# Patient Record
Sex: MALE | Race: WHITE | Employment: OTHER | URBAN - METROPOLITAN AREA
[De-identification: names, ages, dates, MRNs, and addresses within clinical notes are randomized per-mention and may not be internally consistent; named-entity substitution may affect disease eponyms.]

---

## 2021-10-04 PROBLEM — I21.4 NSTEMI (NON-ST ELEVATED MYOCARDIAL INFARCTION) (HCC): Status: ACTIVE | Noted: 2021-10-04

## 2021-10-04 PROBLEM — R07.9 CHEST PAIN: Status: ACTIVE | Noted: 2021-10-04

## 2021-10-07 ENCOUNTER — HOSPITAL ENCOUNTER (EMERGENCY)
Age: 66
Discharge: HOME | End: 2021-10-07
Attending: EMERGENCY MEDICINE
Payer: COMMERCIAL

## 2021-10-07 ENCOUNTER — APPOINTMENT (OUTPATIENT)
Dept: NON INVASIVE DIAGNOSTICS | Age: 66
End: 2021-10-07
Attending: EMERGENCY MEDICINE
Payer: COMMERCIAL

## 2021-10-07 VITALS
OXYGEN SATURATION: 98 % | WEIGHT: 225 LBS | BODY MASS INDEX: 30.48 KG/M2 | RESPIRATION RATE: 18 BRPM | SYSTOLIC BLOOD PRESSURE: 124 MMHG | TEMPERATURE: 98.4 F | HEIGHT: 72 IN | HEART RATE: 70 BPM | DIASTOLIC BLOOD PRESSURE: 73 MMHG

## 2021-10-07 DIAGNOSIS — M79.662 PAIN OF LEFT CALF: Primary | ICD-10-CM

## 2021-10-07 PROCEDURE — 99282 EMERGENCY DEPT VISIT SF MDM: CPT

## 2021-10-07 PROCEDURE — 93971 EXTREMITY STUDY: CPT

## 2021-10-07 NOTE — ED PROVIDER NOTES
EMERGENCY DEPARTMENT HISTORY AND PHYSICAL EXAM        Date: 10/7/2021  Patient Name: Dina Amin    History of Presenting Illness     Chief Complaint   Patient presents with    Leg Pain       History Provided By: Patient    HPI: Dina Amin, 60 y.o. male with history of CAD, hypertension who presents with left calf pain. States symptoms started last night. Pain is in the left calf, constant, not rating, moderate, and worse with movement. States that he had a recent heart catheterization 5 days ago. PCP: None    Current Outpatient Medications   Medication Sig Dispense Refill    ranolazine ER (RANEXA) 500 mg SR tablet Take 1 Tablet by mouth two (2) times a day. 60 Tablet 0    aspirin 81 mg chewable tablet Take 81 mg by mouth daily.  clonazePAM (KlonoPIN) 0.5 mg tablet Take 0.5 mg by mouth nightly as needed for Anxiety.  doxazosin (Cardura) 1 mg tablet Take  by mouth daily.  ezetimibe (Zetia) 10 mg tablet Take  by mouth.  losartan (Cozaar) 25 mg tablet Take 25 mg by mouth daily.  omeprazole (PriLOSEC OTC) 20 mg tablet Take 20 mg by mouth daily.  rosuvastatin (Crestor) 40 mg tablet Take 40 mg by mouth nightly.  naftifine (NAFTIN) 1 % topical cream Apply  to affected area daily. On feet         Past History     Past Medical History:  Past Medical History:   Diagnosis Date    CAD (coronary artery disease)     Heart attack ([facility] [address]     Hypertension     LBBB (left bundle branch block)     S/P AV dusty ablation        Past Surgical History:  Past Surgical History:   Procedure Laterality Date    HX CORONARY ARTERY BYPASS GRAFT      [place] CARDIAC SURG PROCEDURE UNLIST      bypass       Family History:  Family History   Problem Relation Age of Onset    Heart Disease Father        Social History:  Social History     Tobacco Use    Smoking status: Former Smoker   Substance Use Topics    Alcohol use:  Yes    Drug use: Not on file       Allergies:  No Known Allergies        Review of Systems   Review of Systems   Constitutional: Negative for fever. HENT: Negative for congestion. Eyes: Negative for visual disturbance. Respiratory: Negative for shortness of breath. Cardiovascular: Negative for chest pain and leg swelling. Gastrointestinal: Negative for abdominal pain. Genitourinary: Negative for dysuria. Musculoskeletal: Negative for arthralgias. Skin: Negative for rash. Neurological: Negative for headaches. Physical Exam   Constitutional: No acute distress. Well-nourished. Skin: No rash. ENT: No rhinorrhea. No cough. Head is normocephalic and atraumatic. Eye: No proptosis or conjunctival injections. Respiratory: No apparent respiratory distress. Gastrointestinal: Nondistended. Musculoskeletal: No obvious bony deformities. Tenderness to left posterior calf. No leg swelling. No rash. Normal perfusion. Psychiatric: Cooperative. Appropriate mood and affect. Diagnostic Study Results     Labs -   No results found for this or any previous visit (from the past 24 hour(s)). Radiologic Studies -   DUPLEX LOWER EXT VENOUS LEFT    (Results Pending)     CT Results  (Last 48 hours)    None        CXR Results  (Last 48 hours)    None          Medical Decision Making and ED Course     I reviewed the available vital signs, nursing notes, past medical history, past surgical history, family history, and social history. Vital Signs - Reviewed the patient's vital signs. Patient Vitals for the past 12 hrs:   Temp Pulse Resp BP SpO2   10/07/21 1523  70 18 124/73 98 %   10/07/21 1346 98.4 °F (36.9 °C) 69 18 112/68 95 %       Medical Decision Making:   Presented with left calf pain. The differential diagnosis is DVT, superficial thrombophlebitis, muscle strain. No DVT on ultrasound. Does not appear to be swollen. No signs of superficial thrombophlebitis. This could be muscular symptoms such as muscular strain. Unclear etiology.   Recommended close observation, rest, ice, elevation. Recommended he return for repeat ultrasound if he notices any leg swelling. Disposition     Discharged      DISCHARGE PLAN:  1. Current Discharge Medication List      CONTINUE these medications which have NOT CHANGED    Details   ranolazine ER (RANEXA) 500 mg SR tablet Take 1 Tablet by mouth two (2) times a day. Qty: 60 Tablet, Refills: 0      aspirin 81 mg chewable tablet Take 81 mg by mouth daily. clonazePAM (KlonoPIN) 0.5 mg tablet Take 0.5 mg by mouth nightly as needed for Anxiety. doxazosin (Cardura) 1 mg tablet Take  by mouth daily. ezetimibe (Zetia) 10 mg tablet Take  by mouth.      losartan (Cozaar) 25 mg tablet Take 25 mg by mouth daily. omeprazole (PriLOSEC OTC) 20 mg tablet Take 20 mg by mouth daily. rosuvastatin (Crestor) 40 mg tablet Take 40 mg by mouth nightly. naftifine (NAFTIN) 1 % topical cream Apply  to affected area daily. On feet           2. Follow-up Information     Follow up With Specialties Details Why [address] [facility] EMERGENCY DEPT Emergency Medicine Go today As soon as possible if symptoms worsen [facility] [place] [address]  [phone]    Primary care doctor  Schedule an appointment as soon as possible for a visit in 3 days          3. Return to ED if worse     Diagnosis     Clinical impression:   1. Pain of left calf           Attestation:  Please note that this dictation was completed with [employer], the computer voice recognition software. Quite often unanticipated grammatical, syntax, homophones, and other interpretive errors are inadvertently transcribed by the computer software. Please disregard these errors. Please excuse any errors that have escaped final proofreading. Thank you.   Eduarda Sandoval, DO

## 2021-10-07 NOTE — ED TRIAGE NOTES
Reports had heart attack over the weekend, last night developed stinging pain in lt calf, spoke with cardiologist who is concerned for a DVT and was told to come to the ER.

## 2021-10-22 ENCOUNTER — HOSPITAL ENCOUNTER (OUTPATIENT)
Dept: CARDIAC REHAB | Age: 66
Discharge: HOME | End: 2021-10-22
Payer: COMMERCIAL

## 2021-10-22 VITALS — HEIGHT: 72 IN | WEIGHT: 220 LBS | BODY MASS INDEX: 29.8 KG/M2

## 2021-10-22 PROCEDURE — 93798 PHYS/QHP OP CAR RHAB W/ECG: CPT

## 2021-10-22 RX ORDER — CLOPIDOGREL BISULFATE 75 MG/1
75 TABLET ORAL DAILY
COMMUNITY

## 2021-10-22 NOTE — CARDIO/PULMONARY
[address] Cardiac Rehab- Intake Note  Rehab intake with patient and wife-Williams. Jem Clement  1955 presented for cardiac rehab orientation and exercise tolerance test today with a primary diagnosis of NSTEMI. This occurred in [facility] and pt was transferred to [facility] for further cardiac evaluation. Pt sees Dr. Toya Woodruff at [facility] has his primary cardiologist.    Pt has history of MI, CABG. LVEF is 40%. This was obtained via ECHO at the time of this 10/5/21 cardiac event. Cardiac risk factors include: age, gender, HTN, and HDL. Pt  is   and lives with his wife Catherine Rosales. He is retired from [employer]. Depression score PHQ9 is 3 and this is considered normal. Pt states he would like to return to the physical shape he once was. He knows this will take time and is working on being accepting and patient. Patient denied chest pain or SOB during 6 minute walk on treadmill at 2.4 mph, with RPE 11. Cardiac rhythm was SR with PVCs. Exercise plan was developed. Pt will attend cardiac rehab 2-3 times per week. Cardiac Rehab book reviewed and given to patient and wife.    Leretha Lundborg RN

## 2021-10-26 ENCOUNTER — HOSPITAL ENCOUNTER (OUTPATIENT)
Dept: CARDIAC REHAB | Age: 66
Discharge: HOME | End: 2021-10-26
Payer: COMMERCIAL

## 2021-10-26 VITALS — WEIGHT: 223.4 LBS | BODY MASS INDEX: 30.3 KG/M2

## 2021-10-26 PROCEDURE — 93798 PHYS/QHP OP CAR RHAB W/ECG: CPT

## 2021-10-27 ENCOUNTER — HOSPITAL ENCOUNTER (OUTPATIENT)
Dept: CARDIAC REHAB | Age: 66
Discharge: HOME | End: 2021-10-27
Payer: COMMERCIAL

## 2021-10-27 VITALS — BODY MASS INDEX: 30.35 KG/M2 | WEIGHT: 223.8 LBS

## 2021-10-27 PROCEDURE — 93798 PHYS/QHP OP CAR RHAB W/ECG: CPT

## 2021-10-28 ENCOUNTER — HOSPITAL ENCOUNTER (OUTPATIENT)
Dept: CARDIAC REHAB | Age: 66
Discharge: HOME | End: 2021-10-28
Payer: COMMERCIAL

## 2021-10-28 VITALS — BODY MASS INDEX: 30.3 KG/M2 | WEIGHT: 223.4 LBS

## 2021-10-28 PROCEDURE — 93797 PHYS/QHP OP CAR RHAB WO ECG: CPT

## 2021-10-28 PROCEDURE — 93798 PHYS/QHP OP CAR RHAB W/ECG: CPT

## 2021-11-01 ENCOUNTER — HOSPITAL ENCOUNTER (OUTPATIENT)
Dept: CARDIAC REHAB | Age: 66
Discharge: HOME | End: 2021-11-01
Payer: COMMERCIAL

## 2021-11-01 VITALS — BODY MASS INDEX: 30.6 KG/M2 | WEIGHT: 225.6 LBS

## 2021-11-01 PROCEDURE — 93798 PHYS/QHP OP CAR RHAB W/ECG: CPT

## 2021-11-01 NOTE — CARDIO/PULMONARY
[facility] Cardiopulmonary Rehab: Pt reported episodes of possible anginal discomfort which he described at \"Pain radiating from elbow\" down arm. Pt pointed to his [facility] & half way down the medial surface of his lower arm. Occurred while eating dinner on Friday (7 pm). Second episode occurred on Sunday, while walking slowly around neighborhood (7pm). Pain resolved after a few minutes and was not associated with any other discomfort or SOB. Pt reported he usually takes Imdur 30 mg in the morning. The tablet is scored and he initially was on only 1/2 tab. Pt expressed concern that the Imdur ER was \"wearing off too quickly. \" Pt wanted to know if he could take half tab in the morning and half tab at dinner, which may be reasonable for the next 2 days. Pt has appt with cardiologist Julianna Rondon at [facility] on Wed 11/3/21.  Pt to keep record of incidents of arm discomfort and report to MD.

## 2021-11-02 ENCOUNTER — HOSPITAL ENCOUNTER (OUTPATIENT)
Dept: CARDIAC REHAB | Age: 66
Discharge: HOME | End: 2021-11-02
Payer: COMMERCIAL

## 2021-11-02 VITALS — WEIGHT: 233.2 LBS | BODY MASS INDEX: 31.63 KG/M2

## 2021-11-02 PROCEDURE — 93798 PHYS/QHP OP CAR RHAB W/ECG: CPT

## 2021-11-03 ENCOUNTER — HOSPITAL ENCOUNTER (OUTPATIENT)
Dept: CARDIAC REHAB | Age: 66
Discharge: HOME | End: 2021-11-03
Payer: COMMERCIAL

## 2021-11-03 VITALS — WEIGHT: 224.6 LBS | BODY MASS INDEX: 30.46 KG/M2

## 2021-11-03 PROCEDURE — 93798 PHYS/QHP OP CAR RHAB W/ECG: CPT

## 2021-11-09 ENCOUNTER — HOSPITAL ENCOUNTER (OUTPATIENT)
Dept: CARDIAC REHAB | Age: 66
Discharge: HOME | End: 2021-11-09
Payer: COMMERCIAL

## 2021-11-09 VITALS — BODY MASS INDEX: 30.14 KG/M2 | WEIGHT: 222.2 LBS

## 2021-11-09 PROCEDURE — 93798 PHYS/QHP OP CAR RHAB W/ECG: CPT

## 2021-11-11 ENCOUNTER — HOSPITAL ENCOUNTER (OUTPATIENT)
Dept: CARDIAC REHAB | Age: 66
Discharge: HOME | End: 2021-11-11
Payer: COMMERCIAL

## 2021-11-11 VITALS — BODY MASS INDEX: 30.14 KG/M2 | WEIGHT: 222.2 LBS

## 2021-11-11 PROCEDURE — 93798 PHYS/QHP OP CAR RHAB W/ECG: CPT

## 2021-11-11 PROCEDURE — 93797 PHYS/QHP OP CAR RHAB WO ECG: CPT

## 2021-11-12 ENCOUNTER — HOSPITAL ENCOUNTER (OUTPATIENT)
Dept: CARDIAC REHAB | Age: 66
Discharge: HOME | End: 2021-11-12
Payer: COMMERCIAL

## 2021-11-12 VITALS — BODY MASS INDEX: 30.19 KG/M2 | WEIGHT: 222.6 LBS

## 2021-11-12 PROCEDURE — 93798 PHYS/QHP OP CAR RHAB W/ECG: CPT

## 2021-11-15 ENCOUNTER — HOSPITAL ENCOUNTER (OUTPATIENT)
Dept: CARDIAC REHAB | Age: 66
Discharge: HOME | End: 2021-11-15
Payer: COMMERCIAL

## 2021-11-15 VITALS — WEIGHT: 221.8 LBS | BODY MASS INDEX: 30.08 KG/M2

## 2021-11-15 PROCEDURE — 93798 PHYS/QHP OP CAR RHAB W/ECG: CPT

## 2021-11-17 ENCOUNTER — HOSPITAL ENCOUNTER (OUTPATIENT)
Dept: CARDIAC REHAB | Age: 66
Discharge: HOME | End: 2021-11-17
Payer: COMMERCIAL

## 2021-11-17 VITALS — WEIGHT: 221 LBS | BODY MASS INDEX: 29.97 KG/M2

## 2021-11-17 PROCEDURE — 93798 PHYS/QHP OP CAR RHAB W/ECG: CPT

## 2021-11-19 ENCOUNTER — HOSPITAL ENCOUNTER (OUTPATIENT)
Dept: CARDIAC REHAB | Age: 66
Discharge: HOME | End: 2021-11-19
Payer: COMMERCIAL

## 2021-11-19 VITALS — WEIGHT: 222.4 LBS | BODY MASS INDEX: 30.16 KG/M2

## 2021-11-19 PROCEDURE — 93798 PHYS/QHP OP CAR RHAB W/ECG: CPT

## 2021-11-22 ENCOUNTER — HOSPITAL ENCOUNTER (OUTPATIENT)
Dept: CARDIAC REHAB | Age: 66
Discharge: HOME | End: 2021-11-22
Payer: COMMERCIAL

## 2021-11-22 VITALS — WEIGHT: 222.4 LBS | BODY MASS INDEX: 30.16 KG/M2

## 2021-11-22 PROCEDURE — 93797 PHYS/QHP OP CAR RHAB WO ECG: CPT | Performed by: DIETITIAN, REGISTERED

## 2021-11-22 PROCEDURE — 93798 PHYS/QHP OP CAR RHAB W/ECG: CPT

## 2021-11-22 NOTE — CARDIO/PULMONARY
Cardiac Rehab Nutrition Assessment - 1:1 Evaluation           NAME: Marv Mariee : 1955 AGE: 60 y.o. GENDER: male  CARDIAC REHAB ADMITTING DIAGNOSIS: NSTEMI (10/5/21)    PROBLEM LIST:  Patient Active Problem List   Diagnosis Code    Chest pain R07.9    NSTEMI (non-ST elevated myocardial infarction) ([facility] [address] I21.4     CABG in       LABS:   No results found for: HBA1C, BEG1ALXL, ZGR8HYHV  No results found for: CHOL, CHOLPOCT, CHOLX, CHLST, CHOLV, HDL, HDLPOC, HDLP, LDL, LDLCPOC, LDLC, DLDLP, VLDLC, VLDL, TGLX, TRIGL, TRIGP, TGLPOCT, CHHD, CHHDX      MEDICATIONS/SUPPLEMENTS:   [unfilled]  Prior to Admission medications    Medication Sig Start Date End Date Taking? Authorizing Provider   clopidogreL (Plavix) 75 mg tab Take 75 mg by mouth daily. Indications: myocardial reinfarction prevention    Provider, Historical   ISOSORBIDE PO Take 30 mg by mouth daily. Isosorbide Mono ER 30 mg daily    Provider, Historical   NITROGLYCERIN SL 0.3 mg by SubLINGual route. PRN as needed    Provider, Historical   ranolazine ER (RANEXA) 500 mg SR tablet Take 1 Tablet by mouth two (2) times a day. 10/5/21   Andra Taylor MD   aspirin 81 mg chewable tablet Take 81 mg by mouth daily. Bety Troy MD   clonazePAM (KlonoPIN) 0.5 mg tablet Take 0.5 mg by mouth nightly as needed for Anxiety. Bety Troy MD   doxazosin (Cardura) 1 mg tablet Take  by mouth daily. Bety Troy MD   ezetimibe (Zetia) 10 mg tablet Take  by mouth. Bety Troy MD   losartan (Cozaar) 25 mg tablet Take 25 mg by mouth daily. Bety Troy MD   omeprazole (PriLOSEC OTC) 20 mg tablet Take 20 mg by mouth daily. Bety Troy MD   rosuvastatin (Crestor) 40 mg tablet Take 40 mg by mouth nightly. Bety Troy MD   naftifine (NAFTIN) 1 % topical cream Apply  to affected area daily.  On feet    Bety Troy MD           ANTHROPOMETRICS:    Ht Readings from Last 1 Encounters:   10/22/21 6' (1.829 m)      Wt Readings from Last 10 Encounters: 11/19/21 100.9 kg (222 lb 6.4 oz)   11/17/21 100.2 kg (221 lb)   11/15/21 100.6 kg (221 lb 12.8 oz)   11/12/21 101 kg (222 lb 9.6 oz)   11/11/21 100.8 kg (222 lb 3.2 oz)   11/09/21 100.8 kg (222 lb 3.2 oz)   11/03/21 101.9 kg (224 lb 9.6 oz)   11/02/21 105.8 kg (233 lb 3.2 oz)   11/01/21 102.3 kg (225 lb 9.6 oz)   10/28/21 101.3 kg (223 lb 6.4 oz)      IBW:178 # +/- 10%  %IBW: 125 % +/- 10%    BMI: 30.2 kg/M2 Category: obese  Waist: 43 inches    Reported Wt Hx: down 7 to 8 lbs since MI    Reported Diet Hx:    Rate Your Plate Score: 48  (Score 58-72: Making many healthy choices; 41-57: Some choices need improving 24-40: many choices need improving)    24 HOUR DIET RECALL  Breakfast    Lunch PB on sandwich round with carrots   Dinner Homemade potato soup (has cheese & mendez in it) - 1 cup, serving of bread   Snacks Handful cashews   Beverages Unsweetened iced tea, water, coffee with half'n'half     Maddison Locker reports since MI has cut back on portion sizes by about 50% at restaurants and 25% at home. Limiting alcohol to 1 serving. Trying to eat healthier during the day - ex whole wheat sandwich rounds w/ PB. Carrots vs chips. 2 pieces pizza vs 4. Typically skips breakfast, eats early lunch. Not much of a sweet eater at home, might split a dessert at a restaurant. Restaurant meals vary. If at his vacation home will eat every meal out. A couple times a week when local. Also uses some [employer] prepared meals. States sometimes he knows he is eating something not healthy but choosing to do so anyway - though less often and / or smaller portions now in most cases. Environmental/Social:retired a year and a half ago, has a second home in [place], he and his wife grocery shop together. Exercising daily, either at cardiac rehab or uses home exercise equipment (treadmill and bike) for 30 minutes, also occasional yard work.          NUTRITION INTERVENTION:  Nutrition 60 minute one-on-one education & goal setting with Eneida Pineda    Reviewed with Eneida Pineda relevant labs compared to ideals. Reviewed weight history and patient's verbalized weight goal as well as any real or perceived barriers to obtaining the goal. Collaborated with patient to set a specific short and long term weight goal.     Reviewed Rate Your Plate and conducted a verbal diet recall. Assessed for environmental, financial, psychosocial, physical and comorbidities that may impact the food and eating patterns / behaviors of Lio Marvin 47 with patient to set specific nutrient goals as well as specific food / behavior changes that will help patient meet the overall goal of following a heart healthy eating pattern (using guidelines as set forth by the American Heart Association and modeled after healthful eating patterns as recognized by the USDA Dietary Guidelines such as DASH, Mediterranean or plant-based). Briefly reviewed with Eneida Pineda the nutrition information in the Cardiac Rehab patient education book and encouraged Eneida Pineda to read thoroughly, ask questions as needed, and use for future reference for heart healthy nutrition information. Eneida Pineda is scheduled to participate in select Cardiac Rehab group nutrition classes. PATIENT GOALS:    Weight Goals:  Short Term Weight Goal:210 lbs  Long Term Weight Goal: </= 205-210 lbs    Nutrition Goals:  Daily Recommendations:  Calories: 1223-3229 /day  (using [employer] with AF 1.3-1.4; - 388 to 772 for weight loss)    Saturated Fat: no more than 12-13 g/day  Trans Fat: 0 g/day  Sodium: no more than 4779-4017 mg/day  Fruit: 2 cups / day  Vegetables: 2.5 or more cups/day    Other:  - read and compare food labels  - limit restaurant meals  - look before you leap (look up nutrition facts)  - try  to Go or Healthy Choice meals for healthier convenient meal option. Keeping a food diary was recommended.             Questions addressed. Follow-up plans discussed. Carrie Rachel verbalized understanding.             Florence Johnson RD

## 2021-11-24 ENCOUNTER — HOSPITAL ENCOUNTER (OUTPATIENT)
Dept: CARDIAC REHAB | Age: 66
Discharge: HOME | End: 2021-11-24
Payer: COMMERCIAL

## 2021-11-24 VITALS — BODY MASS INDEX: 29.89 KG/M2 | WEIGHT: 220.4 LBS

## 2021-11-24 PROCEDURE — 93798 PHYS/QHP OP CAR RHAB W/ECG: CPT

## 2021-12-01 ENCOUNTER — HOSPITAL ENCOUNTER (OUTPATIENT)
Dept: CARDIAC REHAB | Age: 66
Discharge: HOME | End: 2021-12-01
Payer: MEDICARE

## 2021-12-01 VITALS — BODY MASS INDEX: 30.16 KG/M2 | WEIGHT: 222.4 LBS

## 2021-12-01 PROCEDURE — 93798 PHYS/QHP OP CAR RHAB W/ECG: CPT

## 2021-12-02 ENCOUNTER — HOSPITAL ENCOUNTER (OUTPATIENT)
Dept: CARDIAC REHAB | Age: 66
Discharge: HOME | End: 2021-12-02
Payer: MEDICARE

## 2021-12-02 VITALS — WEIGHT: 222.6 LBS | BODY MASS INDEX: 30.19 KG/M2

## 2021-12-02 PROCEDURE — 93797 PHYS/QHP OP CAR RHAB WO ECG: CPT

## 2021-12-02 PROCEDURE — 93798 PHYS/QHP OP CAR RHAB W/ECG: CPT

## 2021-12-07 ENCOUNTER — HOSPITAL ENCOUNTER (OUTPATIENT)
Dept: CARDIAC REHAB | Age: 66
Discharge: HOME | End: 2021-12-07
Payer: MEDICARE

## 2021-12-07 VITALS — BODY MASS INDEX: 29.97 KG/M2 | WEIGHT: 221 LBS

## 2021-12-07 PROCEDURE — 93798 PHYS/QHP OP CAR RHAB W/ECG: CPT

## 2021-12-09 ENCOUNTER — HOSPITAL ENCOUNTER (OUTPATIENT)
Dept: CARDIAC REHAB | Age: 66
Discharge: HOME | End: 2021-12-09
Payer: MEDICARE

## 2021-12-09 VITALS — BODY MASS INDEX: 30.35 KG/M2 | WEIGHT: 223.8 LBS

## 2021-12-09 PROCEDURE — 93798 PHYS/QHP OP CAR RHAB W/ECG: CPT

## 2021-12-14 ENCOUNTER — HOSPITAL ENCOUNTER (OUTPATIENT)
Dept: CARDIAC REHAB | Age: 66
Discharge: HOME | End: 2021-12-14
Payer: MEDICARE

## 2021-12-14 VITALS — BODY MASS INDEX: 30.11 KG/M2 | WEIGHT: 222 LBS

## 2021-12-14 PROCEDURE — 93798 PHYS/QHP OP CAR RHAB W/ECG: CPT

## 2021-12-16 ENCOUNTER — HOSPITAL ENCOUNTER (OUTPATIENT)
Dept: CARDIAC REHAB | Age: 66
Discharge: HOME | End: 2021-12-16
Payer: MEDICARE

## 2021-12-16 VITALS — WEIGHT: 221.8 LBS | BODY MASS INDEX: 30.08 KG/M2

## 2021-12-16 PROCEDURE — 93798 PHYS/QHP OP CAR RHAB W/ECG: CPT

## 2021-12-21 ENCOUNTER — HOSPITAL ENCOUNTER (OUTPATIENT)
Dept: CARDIAC REHAB | Age: 66
Discharge: HOME | End: 2021-12-21
Payer: MEDICARE

## 2021-12-21 VITALS — BODY MASS INDEX: 30.11 KG/M2 | WEIGHT: 222 LBS

## 2021-12-21 PROCEDURE — 93798 PHYS/QHP OP CAR RHAB W/ECG: CPT

## 2021-12-22 ENCOUNTER — HOSPITAL ENCOUNTER (OUTPATIENT)
Dept: CARDIAC REHAB | Age: 66
Discharge: HOME | End: 2021-12-22
Payer: MEDICARE

## 2021-12-22 VITALS — BODY MASS INDEX: 30.08 KG/M2 | WEIGHT: 221.8 LBS

## 2021-12-22 PROCEDURE — 93798 PHYS/QHP OP CAR RHAB W/ECG: CPT

## 2022-01-04 ENCOUNTER — APPOINTMENT (OUTPATIENT)
Dept: CARDIAC REHAB | Age: 67
End: 2022-01-04
Payer: MEDICARE

## 2022-01-06 ENCOUNTER — APPOINTMENT (OUTPATIENT)
Dept: CARDIAC REHAB | Age: 67
End: 2022-01-06
Payer: MEDICARE

## 2022-01-18 ENCOUNTER — HOSPITAL ENCOUNTER (OUTPATIENT)
Dept: CARDIAC REHAB | Age: 67
Discharge: HOME | End: 2022-01-18
Payer: MEDICARE

## 2022-01-18 VITALS — WEIGHT: 221 LBS | BODY MASS INDEX: 29.97 KG/M2

## 2022-01-18 PROCEDURE — 93798 PHYS/QHP OP CAR RHAB W/ECG: CPT

## 2022-01-20 ENCOUNTER — HOSPITAL ENCOUNTER (OUTPATIENT)
Dept: CARDIAC REHAB | Age: 67
Discharge: HOME | End: 2022-01-20
Payer: MEDICARE

## 2022-01-20 VITALS — WEIGHT: 222.2 LBS | BODY MASS INDEX: 30.14 KG/M2

## 2022-01-20 PROCEDURE — 93798 PHYS/QHP OP CAR RHAB W/ECG: CPT

## 2022-01-25 ENCOUNTER — HOSPITAL ENCOUNTER (OUTPATIENT)
Dept: CARDIAC REHAB | Age: 67
Discharge: HOME | End: 2022-01-25
Payer: MEDICARE

## 2022-01-25 VITALS — BODY MASS INDEX: 30.08 KG/M2 | WEIGHT: 221.8 LBS

## 2022-01-25 PROCEDURE — 93798 PHYS/QHP OP CAR RHAB W/ECG: CPT

## 2022-01-26 ENCOUNTER — HOSPITAL ENCOUNTER (OUTPATIENT)
Dept: CARDIAC REHAB | Age: 67
Discharge: HOME | End: 2022-01-26
Payer: MEDICARE

## 2022-01-26 VITALS — WEIGHT: 221.2 LBS | BODY MASS INDEX: 30 KG/M2

## 2022-01-26 PROCEDURE — 93798 PHYS/QHP OP CAR RHAB W/ECG: CPT

## 2022-01-31 ENCOUNTER — HOSPITAL ENCOUNTER (OUTPATIENT)
Dept: CARDIAC REHAB | Age: 67
Discharge: HOME | End: 2022-01-31
Payer: MEDICARE

## 2022-01-31 VITALS — WEIGHT: 220.6 LBS | BODY MASS INDEX: 29.92 KG/M2

## 2022-01-31 PROCEDURE — 93798 PHYS/QHP OP CAR RHAB W/ECG: CPT

## 2022-02-03 ENCOUNTER — APPOINTMENT (OUTPATIENT)
Dept: CARDIAC REHAB | Age: 67
End: 2022-02-03
Payer: MEDICARE

## 2022-02-07 ENCOUNTER — HOSPITAL ENCOUNTER (OUTPATIENT)
Dept: CARDIAC REHAB | Age: 67
Discharge: HOME | End: 2022-02-07
Payer: MEDICARE

## 2022-02-07 VITALS — WEIGHT: 223 LBS | BODY MASS INDEX: 30.24 KG/M2

## 2022-02-07 PROCEDURE — 93798 PHYS/QHP OP CAR RHAB W/ECG: CPT

## 2022-02-08 ENCOUNTER — HOSPITAL ENCOUNTER (OUTPATIENT)
Dept: CARDIAC REHAB | Age: 67
Discharge: HOME | End: 2022-02-08
Payer: MEDICARE

## 2022-02-08 VITALS — BODY MASS INDEX: 30.19 KG/M2 | WEIGHT: 222.6 LBS

## 2022-02-08 PROCEDURE — 93798 PHYS/QHP OP CAR RHAB W/ECG: CPT

## 2022-02-09 ENCOUNTER — HOSPITAL ENCOUNTER (OUTPATIENT)
Dept: CARDIAC REHAB | Age: 67
Discharge: HOME | End: 2022-02-09
Payer: MEDICARE

## 2022-02-09 VITALS — BODY MASS INDEX: 30.16 KG/M2 | WEIGHT: 222.4 LBS

## 2022-02-09 PROCEDURE — 93798 PHYS/QHP OP CAR RHAB W/ECG: CPT

## 2022-03-19 PROBLEM — R07.9 CHEST PAIN: Status: ACTIVE | Noted: 2021-10-04

## 2022-03-20 PROBLEM — I21.4 NSTEMI (NON-ST ELEVATED MYOCARDIAL INFARCTION) (HCC): Status: ACTIVE | Noted: 2021-10-04

## 2022-10-31 ENCOUNTER — HOSPITAL ENCOUNTER (EMERGENCY)
Age: 67
Discharge: HOME | End: 2022-10-31
Attending: EMERGENCY MEDICINE
Payer: MEDICARE

## 2022-10-31 ENCOUNTER — APPOINTMENT (OUTPATIENT)
Dept: CT IMAGING | Age: 67
End: 2022-10-31
Attending: STUDENT IN AN ORGANIZED HEALTH CARE EDUCATION/TRAINING PROGRAM
Payer: MEDICARE

## 2022-10-31 VITALS
TEMPERATURE: 97.9 F | BODY MASS INDEX: 30.1 KG/M2 | RESPIRATION RATE: 16 BRPM | SYSTOLIC BLOOD PRESSURE: 167 MMHG | HEIGHT: 71 IN | DIASTOLIC BLOOD PRESSURE: 83 MMHG | WEIGHT: 215 LBS | HEART RATE: 58 BPM | OXYGEN SATURATION: 98 %

## 2022-10-31 DIAGNOSIS — R10.32 ABDOMINAL PAIN, LLQ (LEFT LOWER QUADRANT): Primary | ICD-10-CM

## 2022-10-31 LAB
ALBUMIN SERPL-MCNC: 4.4 G/DL (ref 3.5–5.2)
ALBUMIN/GLOB SERPL: 1.6 {RATIO} (ref 1.1–2.2)
ALP SERPL-CCNC: 58 U/L (ref 40–129)
ALT SERPL-CCNC: 39 U/L (ref 10–50)
ANION GAP SERPL CALC-SCNC: 10 MMOL/L (ref 5–15)
APPEARANCE UR: CLEAR
AST SERPL-CCNC: 33 U/L (ref 10–50)
BACTERIA URNS QL MICRO: NEGATIVE /HPF
BASOPHILS # BLD: 0 K/UL (ref 0–0.1)
BASOPHILS NFR BLD: 1 % (ref 0–1)
BILIRUB SERPL-MCNC: 0.8 MG/DL (ref 0.2–1)
BILIRUB UR QL: NEGATIVE
BUN SERPL-MCNC: 17 MG/DL (ref 8–23)
BUN/CREAT SERPL: 21 (ref 12–20)
CALCIUM SERPL-MCNC: 9.5 MG/DL (ref 8.8–10.2)
CHLORIDE SERPL-SCNC: 103 MMOL/L (ref 98–107)
CO2 SERPL-SCNC: 28 MMOL/L (ref 22–29)
COLOR UR: NORMAL
CREAT SERPL-MCNC: 0.8 MG/DL (ref 0.7–1.2)
DIFFERENTIAL METHOD BLD: ABNORMAL
EOSINOPHIL # BLD: 0.4 K/UL (ref 0–0.4)
EOSINOPHIL NFR BLD: 9 % (ref 0–7)
EPITH CASTS URNS QL MICRO: NORMAL /LPF
ERYTHROCYTE [DISTWIDTH] IN BLOOD BY AUTOMATED COUNT: 13.7 % (ref 11.5–14.5)
GLOBULIN SER CALC-MCNC: 2.8 G/DL (ref 2–4)
GLUCOSE SERPL-MCNC: 98 MG/DL (ref 65–100)
GLUCOSE UR STRIP.AUTO-MCNC: NEGATIVE MG/DL
HCT VFR BLD AUTO: 38 % (ref 36.6–50.3)
HGB BLD-MCNC: 12.4 G/DL (ref 12.1–17)
HGB UR QL STRIP: NEGATIVE
IMM GRANULOCYTES # BLD AUTO: 0 K/UL (ref 0–0.04)
IMM GRANULOCYTES NFR BLD AUTO: 0 % (ref 0–0.5)
KETONES UR QL STRIP.AUTO: NEGATIVE MG/DL
LEUKOCYTE ESTERASE UR QL STRIP.AUTO: NEGATIVE
LIPASE SERPL-CCNC: 28 U/L (ref 13–60)
LYMPHOCYTES # BLD: 1.7 K/UL (ref 0.8–3.5)
LYMPHOCYTES NFR BLD: 36 % (ref 12–49)
MCH RBC QN AUTO: 28.4 PG (ref 26–34)
MCHC RBC AUTO-ENTMCNC: 32.6 G/DL (ref 30–36.5)
MCV RBC AUTO: 87.2 FL (ref 80–99)
MONOCYTES # BLD: 0.5 K/UL (ref 0–1)
MONOCYTES NFR BLD: 10 % (ref 5–13)
NEUTS SEG # BLD: 2.2 K/UL (ref 1.8–8)
NEUTS SEG NFR BLD: 45 % (ref 32–75)
NITRITE UR QL STRIP.AUTO: NEGATIVE
NRBC # BLD: 0 K/UL (ref 0–0.01)
NRBC BLD-RTO: 0 PER 100 WBC
PH UR STRIP: 6 [PH] (ref 5–8)
PLATELET # BLD AUTO: 224 K/UL (ref 150–400)
PMV BLD AUTO: 10 FL (ref 8.9–12.9)
POTASSIUM SERPL-SCNC: 4.6 MMOL/L (ref 3.5–5.1)
PROT SERPL-MCNC: 7.2 G/DL (ref 6.4–8.3)
PROT UR STRIP-MCNC: NEGATIVE MG/DL
RBC # BLD AUTO: 4.36 M/UL (ref 4.1–5.7)
RBC #/AREA URNS HPF: NORMAL /HPF (ref 0–5)
SODIUM SERPL-SCNC: 141 MMOL/L (ref 136–145)
SP GR UR REFRACTOMETRY: <1.005 (ref 1–1.03)
UR CULT HOLD, URHOLD: NORMAL
UROBILINOGEN UR QL STRIP.AUTO: 0.2 EU/DL (ref 0.2–1)
WBC # BLD AUTO: 4.8 K/UL (ref 4.1–11.1)
WBC URNS QL MICRO: NORMAL /HPF (ref 0–4)

## 2022-10-31 PROCEDURE — 80053 COMPREHEN METABOLIC PANEL: CPT

## 2022-10-31 PROCEDURE — 81001 URINALYSIS AUTO W/SCOPE: CPT

## 2022-10-31 PROCEDURE — 74011000636 HC RX REV CODE- 636: Performed by: EMERGENCY MEDICINE

## 2022-10-31 PROCEDURE — 74011250637 HC RX REV CODE- 250/637: Performed by: STUDENT IN AN ORGANIZED HEALTH CARE EDUCATION/TRAINING PROGRAM

## 2022-10-31 PROCEDURE — 99285 EMERGENCY DEPT VISIT HI MDM: CPT

## 2022-10-31 PROCEDURE — 83690 ASSAY OF LIPASE: CPT

## 2022-10-31 PROCEDURE — 36415 COLL VENOUS BLD VENIPUNCTURE: CPT

## 2022-10-31 PROCEDURE — 85025 COMPLETE CBC W/AUTO DIFF WBC: CPT

## 2022-10-31 PROCEDURE — 74177 CT ABD & PELVIS W/CONTRAST: CPT

## 2022-10-31 RX ORDER — ACETAMINOPHEN 500 MG
1000 TABLET ORAL ONCE
Status: COMPLETED | OUTPATIENT
Start: 2022-10-31 | End: 2022-10-31

## 2022-10-31 RX ADMIN — ACETAMINOPHEN 1000 MG: 500 TABLET ORAL at 13:01

## 2022-10-31 RX ADMIN — IOPAMIDOL 100 ML: 755 INJECTION, SOLUTION INTRAVENOUS at 13:31

## 2022-10-31 NOTE — DISCHARGE INSTRUCTIONS
If the hernia comes out and you are unable to get it back in, return to the emergency department.   Follow-up with general surgery

## 2022-10-31 NOTE — ED TRIAGE NOTES
Pt to ER with c/o LLQ abd pain intermittently since last week. Pt reports two episodes of pain that lasts about 2-3 minutes at a time. Pt reports tenderness at the site.

## 2022-10-31 NOTE — ED PROVIDER NOTES
Abdominal Pain   Pertinent negatives include no fever, no diarrhea, no nausea, no vomiting, no constipation, no arthralgias, no myalgias and no chest pain. Patient is a 73 y.o. M with history of CAD, MI, hypertension, left bundle branch block status post ablation who presents today with complaints of left lower quadrant abdominal pain. Reports today, he was standing and felt a sharp pain in his lower left quadrant also felt abdominal bulge. That is which persisted for couple of minutes as well as pain and then bulge went away and pain resolved. This did happen 2 other times within the last month. Reports he was moving into a new house and has been doing a lot of heavy lifting lately. Reports he does have some abdominal soreness but pain is essentially resolved, no abdominal bulge at this time denies any diarrhea, constipation. He has had 2 bowel movements today which were normal.  He is passing gas, tolerating p.o. Denies any fevers, chills, testicular pain, flank pain, dysuria, hematuria, chest pain, shortness of breath. He has not taken any medication for the pain. He does report a couple months ago, he had a colonoscopy and was told he had diverticulosis. PMH: CAD, MI, hypertension, left bundle branch block status post ablation  Surgical History: See below  Smoking: None  Alcohol: None  Drug Use: None  ALLERGIES: Patient has no known allergies.     Past Medical History:   Diagnosis Date    CAD (coronary artery disease)     Heart attack ([facility] [address]     Hypertension     LBBB (left bundle branch block)     S/P ablation    S/P AV dusty ablation      Past Surgical History:   Procedure Laterality Date    HX CORONARY ARTERY BYPASS GRAFT      HX HEENT      L ear reconstruction- genetic    HX ORTHOPAEDIC      bilateral shoulder scope 2005, 2010    [place] CARDIAC SURG PROCEDURE UNLIST      CABG 2003  [facility]    [place] CARDIAC SURG PROCEDURE UNLIST      ablation      Family History:   Problem Relation Age of Onset Heart Disease Father      Social History     Socioeconomic History    Marital status:      Spouse name: Maci Townsend    Number of children: 1    Years of education: 16    Highest education level: Bachelor's degree (e.g., BA, AB, BS)   Occupational History    Not on file   Tobacco Use    Smoking status: Former     Types: Cigarettes     Quit date: 2018     Years since quittin.5    Smokeless tobacco: Never   Vaping Use    Vaping Use: Never used   Substance and Sexual Activity    Alcohol use: Yes     Alcohol/week: 2.0 standard drinks     Types: 2 Cans of beer per week     Comment: special weekly    Drug use: Never    Sexual activity: Not on file   Other Topics Concern     Service Not Asked    Blood Transfusions Not Asked    Caffeine Concern Yes     Comment: 4-5 cups/day    Occupational Exposure Not Asked    Hobby Hazards Not Asked    Sleep Concern Not Asked    Stress Concern Not Asked    Weight Concern Not Asked    Special Diet Not Asked    Back Care Not Asked    Exercise Not Asked    Bike Helmet Not Asked    Seat Belt Not Asked    Self-Exams Not Asked   Social History Narrative    Not on file     Social Determinants of Health     Financial Resource Strain: Not on file   Food Insecurity: Not on file   Transportation Needs: Not on file   Physical Activity: Not on file   Stress: Not on file   Social Connections: Not on file   Intimate Partner Violence: Not on file   Housing Stability: Not on file             Review of Systems   Constitutional:  Negative for fatigue and fever. HENT:  Negative for congestion. Respiratory:  Negative for cough, shortness of breath and wheezing. Cardiovascular:  Negative for chest pain. Gastrointestinal:  Positive for abdominal pain. Negative for constipation, diarrhea, nausea and vomiting. Genitourinary:  Negative for flank pain. Musculoskeletal:  Negative for arthralgias and myalgias. Skin:  Negative for wound.    Neurological:  Negative for dizziness, seizures, light-headedness and numbness. Psychiatric/Behavioral:  Negative for confusion. Vitals:    10/31/22 1152   BP: (!) 167/83   Pulse: (!) 58   Resp: 16   Temp: 97.9 °F (36.6 °C)   SpO2: 98%   Weight: 97.5 kg (215 lb)   Height: 5' 11\" (1.803 m)            Physical Exam  Vitals and nursing note reviewed. Constitutional:       General: He is not in acute distress. Appearance: Normal appearance. He is not ill-appearing, toxic-appearing or diaphoretic. HENT:      Head: Normocephalic and atraumatic. Nose: Nose normal.      Mouth/Throat:      Mouth: Mucous membranes are moist.   Cardiovascular:      Rate and Rhythm: Normal rate and regular rhythm. Pulmonary:      Effort: Pulmonary effort is normal. No respiratory distress. Breath sounds: Normal breath sounds. No stridor. No wheezing, rhonchi or rales. Chest:      Chest wall: No tenderness. Abdominal:      General: Abdomen is flat. Bowel sounds are normal.      Palpations: Abdomen is soft. Tenderness: There is abdominal tenderness in the left lower quadrant. There is no right CVA tenderness or left CVA tenderness. Negative signs include Fofana's sign and McBurney's sign. Musculoskeletal:         General: Normal range of motion. Cervical back: Normal range of motion. Skin:     General: Skin is warm and dry. Neurological:      Mental Status: He is alert and oriented to person, place, and time. Mental status is at baseline. Psychiatric:         Mood and Affect: Mood normal.         Behavior: Behavior normal.         Thought Content:  Thought content normal.            LABORATORY RESULTS:  Recent Results (from the past 24 hour(s))   CBC WITH AUTOMATED DIFF    Collection Time: 10/31/22 12:17 PM   Result Value Ref Range    WBC 4.8 4.1 - 11.1 K/uL    RBC 4.36 4.10 - 5.70 M/uL    HGB 12.4 12.1 - 17.0 g/dL    HCT 38.0 36.6 - 50.3 %    MCV 87.2 80.0 - 99.0 FL    MCH 28.4 26.0 - 34.0 PG    MCHC 32.6 30.0 - 36.5 g/dL    RDW 13.7 11.5 - 14.5 %    PLATELET 709 513 - 592 K/uL    MPV 10.0 8.9 - 12.9 FL    NRBC 0.0 0  WBC    ABSOLUTE NRBC 0.00 0.00 - 0.01 K/uL    NEUTROPHILS 45 32 - 75 %    LYMPHOCYTES 36 12 - 49 %    MONOCYTES 10 5 - 13 %    EOSINOPHILS 9 (H) 0 - 7 %    BASOPHILS 1 0 - 1 %    IMMATURE GRANULOCYTES 0 0.0 - 0.5 %    ABS. NEUTROPHILS 2.2 1.8 - 8.0 K/UL    ABS. LYMPHOCYTES 1.7 0.8 - 3.5 K/UL    ABS. MONOCYTES 0.5 0.0 - 1.0 K/UL    ABS. EOSINOPHILS 0.4 0.0 - 0.4 K/UL    ABS. BASOPHILS 0.0 0.0 - 0.1 K/UL    ABS. IMM. GRANS. 0.0 0.00 - 0.04 K/UL    DF AUTOMATED     METABOLIC PANEL, COMPREHENSIVE    Collection Time: 10/31/22 12:17 PM   Result Value Ref Range    Sodium 141 136 - 145 mmol/L    Potassium 4.6 3.5 - 5.1 mmol/L    Chloride 103 98 - 107 mmol/L    CO2 28 22 - 29 mmol/L    Anion gap 10 5 - 15 mmol/L    Glucose 98 65 - 100 mg/dL    BUN 17 8 - 23 MG/DL    Creatinine 0.80 0.70 - 1.20 MG/DL    BUN/Creatinine ratio 21 (H) 12 - 20      eGFR >60 >60 ml/min/1.73m2    Calcium 9.5 8.8 - 10.2 MG/DL    Bilirubin, total 0.8 0.2 - 1.0 MG/DL    ALT (SGPT) 39 10 - 50 U/L    AST (SGOT) 33 10 - 50 U/L    Alk.  phosphatase 58 40 - 129 U/L    Protein, total 7.2 6.4 - 8.3 g/dL    Albumin 4.4 3.5 - 5.2 g/dL    Globulin 2.8 2.0 - 4.0 g/dL    A-G Ratio 1.6 1.1 - 2.2     LIPASE    Collection Time: 10/31/22 12:17 PM   Result Value Ref Range    Lipase 28 13 - 60 U/L   URINALYSIS W/MICROSCOPIC    Collection Time: 10/31/22 12:17 PM   Result Value Ref Range    Color YELLOW/STRAW      Appearance CLEAR CLEAR      Specific gravity <1.005 1.003 - 1.030    pH (UA) 6.0 5.0 - 8.0      Protein Negative NEG mg/dL    Glucose Negative NEG mg/dL    Ketone Negative NEG mg/dL    Bilirubin Negative NEG      Blood Negative NEG      Urobilinogen 0.2 0.2 - 1.0 EU/dL    Nitrites Negative NEG      Leukocyte Esterase Negative NEG      WBC 0-4 0 - 4 /hpf    RBC 0-5 0 - 5 /hpf    Epithelial cells FEW FEW /lpf    Bacteria Negative NEG /hpf   URINE CULTURE HOLD SAMPLE Collection Time: 10/31/22 12:17 PM    Specimen: Serum   Result Value Ref Range    Urine culture hold        Urine on hold in Microbiology dept for 2 days. If unpreserved urine is submitted, it cannot be used for addtional testing after 24 hours, recollection will be required. IMAGING RESULTS:  CT ABD PELV W CONT    Result Date: 10/31/2022  1. No acute intra-abdominal pathology. 2.  Relative atrophy of the left kidney with probable left-sided renal artery stenosis. Nonspecific left renal stone. 3.  Colonic diverticulosis without definite evidence of acute diverticulitis. EKG INTERPRETATION:   See course summary for interpretation    MEDICATIONS GIVEN:  Medications   acetaminophen (TYLENOL) tablet 1,000 mg (1,000 mg Oral Given 10/31/22 1301)   iopamidoL (ISOVUE-370) 76 % injection 100 mL (100 mL IntraVENous Given 10/31/22 1331)            MDM  Patient is a 73 y.o. M with history of CAD, MI, hypertension, left bundle branch block status post ablation who presents today with complaints of left lower quadrant abdominal pain. Reports today, he was standing and felt a sharp pain in his lower left quadrant also felt abdominal bulge. That is which persisted for couple of minutes as well as pain and then bulge went away and pain resolved. This did happen 2 other times within the last month. Pain is essentially resolved at this time, denies any abdominal bulge. He has no diarrhea, constipation, passing gas, tolerating p.o. On exam, he does have some very mild tenderness to palpation left lower quadrant but no evidence of mass, bulge, hernia. Differentials include hernia, diverticulitis, SBO. Order CT abdomen pelvis, labs, Tylenol for pain. ED Course as of 10/31/22 1404   Mon Oct 31, 2022   1400 CT abdomen pelvis is resulted revealing no acute intra-abdominal process, does show diverticulosis. Suspect hernia, recommend patient follow-up with surgery as an outpatient.   Did give strict return precautions should the bulge be unreducible to return to the ED. [KJ]      ED Course User Index  [KJ] Edmundo White PA-C       Discussed results and work-up with patient and answered all questions, the patient expresses understanding and agrees with the care plan and disposition. The patient was given an opportunity to ask questions and all concerns raised were addressed prior to discharge. Recommended patient follow-up with provider as listed below. Counseled patient on standard home and self-care measures. Specifically explained the emergent conditions that could arise and clearly instructed the patient to return to the emergency department for those and any other new, worsening, or concerning symptoms. Patient stable and ready for discharge. IMPRESSION:  1.  Abdominal pain, LLQ (left lower quadrant)        DISPOSITION:  Discharge    PLAN:  Follow-up Information       Follow up With Specialties Details Why [address] [place] [address]  Schedule an appointment as soon as possible for a visit   [address] [place] [address]  [phone]          Current Discharge Medication List

## 2023-05-24 RX ORDER — CLONAZEPAM 0.5 MG/1
0.5 TABLET ORAL
COMMUNITY

## 2023-05-24 RX ORDER — CLOPIDOGREL BISULFATE 75 MG/1
75 TABLET ORAL DAILY
COMMUNITY

## 2023-05-24 RX ORDER — ASPIRIN 81 MG/1
81 TABLET, CHEWABLE ORAL DAILY
COMMUNITY

## 2023-05-24 RX ORDER — LOSARTAN POTASSIUM 25 MG/1
25 TABLET ORAL DAILY
COMMUNITY

## 2023-05-24 RX ORDER — DOXAZOSIN MESYLATE 1 MG/1
TABLET ORAL DAILY
COMMUNITY

## 2023-05-24 RX ORDER — EZETIMIBE 10 MG/1
TABLET ORAL
COMMUNITY

## 2023-05-24 RX ORDER — RANOLAZINE 500 MG/1
500 TABLET, EXTENDED RELEASE ORAL 2 TIMES DAILY
COMMUNITY
Start: 2021-10-05

## 2023-05-24 RX ORDER — OMEPRAZOLE 20 MG/1
20 TABLET, DELAYED RELEASE ORAL DAILY
COMMUNITY

## 2023-05-24 RX ORDER — ROSUVASTATIN CALCIUM 40 MG/1
40 TABLET, COATED ORAL NIGHTLY
COMMUNITY

## 2023-05-24 RX ORDER — NAFTIFINE HYDROCHLORIDE 1 MG/G
CREAM TOPICAL DAILY
COMMUNITY